# Patient Record
Sex: MALE | Race: WHITE | HISPANIC OR LATINO | Employment: STUDENT | URBAN - METROPOLITAN AREA
[De-identification: names, ages, dates, MRNs, and addresses within clinical notes are randomized per-mention and may not be internally consistent; named-entity substitution may affect disease eponyms.]

---
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
## 2025-06-09 ENCOUNTER — OFFICE VISIT (OUTPATIENT)
Dept: FAMILY MEDICINE CLINIC | Facility: CLINIC | Age: 20
End: 2025-06-09
Payer: COMMERCIAL

## 2025-06-09 VITALS
OXYGEN SATURATION: 98 % | TEMPERATURE: 97.3 F | HEART RATE: 110 BPM | HEIGHT: 72 IN | BODY MASS INDEX: 28.93 KG/M2 | WEIGHT: 213.6 LBS | DIASTOLIC BLOOD PRESSURE: 82 MMHG | SYSTOLIC BLOOD PRESSURE: 126 MMHG

## 2025-06-09 DIAGNOSIS — Z00.00 WELL ADULT EXAM: Primary | ICD-10-CM

## 2025-06-09 DIAGNOSIS — Z01.89 PATIENT REQUESTED DIAGNOSTIC TESTING: ICD-10-CM

## 2025-06-09 DIAGNOSIS — R06.83 SNORING: ICD-10-CM

## 2025-06-09 PROCEDURE — 99395 PREV VISIT EST AGE 18-39: CPT | Performed by: NURSE PRACTITIONER

## 2025-06-09 NOTE — PROGRESS NOTES
"Chief Complaint  Annual Exam    Subjective        Juan J Messina presents to [facility]  History of Present Illness  Patient presents to the office today for annual wellness exam. Denies any issues or concerns. Discussed getting lab work to obtain baseline. He verbalized understanding.   He is requesting a sleep study due to both of his parents having sleep apnea. He states he has heard there can be a genetic component so he is worried he could have this and not know it. He reports sleeping 6-8 hours a night and feels rested upon waking. He denies daytime sleepiness. He has been told that he snores.       Objective   Vital Signs:  /82 (BP Location: Right arm, Patient Position: Sitting, Cuff Size: Adult)   Pulse 110   Temp 97.3 °F (36.3 °C) (Temporal)   Ht 182.9 cm (72\")   Wt 96.9 kg (213 lb 9.6 oz)   SpO2 98%   BMI 28.97 kg/m²   Estimated body mass index is 28.97 kg/m² as calculated from the following:    Height as of this encounter: 182.9 cm (72\").    Weight as of this encounter: 96.9 kg (213 lb 9.6 oz).    BMI is >= 25 and <30. (Overweight) The following options were offered after discussion;: exercise counseling/recommendations and nutrition counseling/recommendations      Physical Exam  Vitals reviewed.   Constitutional:       Appearance: Normal appearance.   Cardiovascular:      Rate and Rhythm: Normal rate and regular rhythm.      Pulses: Normal pulses.      Heart sounds: Normal heart sounds.   Pulmonary:      Effort: Pulmonary effort is normal.      Breath sounds: Normal breath sounds.   Abdominal:      General: Bowel sounds are normal.      Palpations: Abdomen is soft.   Musculoskeletal:         General: Normal range of motion.   Skin:     General: Skin is warm and dry.   Neurological:      Mental Status: He is alert.   Psychiatric:         Mood and Affect: Mood normal.         Behavior: Behavior normal.        Result Review :               Assessment and Plan "   Diagnoses and all orders for this visit:    1. Well adult exam (Primary)  -     CBC (No Diff); Future  -     Comprehensive Metabolic Panel; Future  -     Lipid Panel; Future  -     TSH; Future    2. Snoring    3. Patient requested diagnostic testing             Follow Up   Return in about 1 year (around 6/9/2026) for Annual physical.  Patient was given instructions and counseling regarding his condition or for health maintenance advice. Please see specific information pulled into the AVS if appropriate.

## 2025-06-09 NOTE — PROGRESS NOTES
"Chief Complaint  Annual Exam    Subjective         Juan J Messina presents to [facility]  Presents to the office for a wellness physical.  Patient will have labs completed to further evaluate.  He does request a sleep study.  He states that both of his parents have sleep apnea and he has concerns about him developing this.  He does state that he snores through the night.  Patient denies being fatigued the next day.  He does state that he sleeps 6 to 8 hours nightly.         Objective     Vitals:    06/09/25 1432   BP: 126/82   BP Location: Right arm   Patient Position: Sitting   Cuff Size: Adult   Pulse: 110   Temp: 97.3 °F (36.3 °C)   TempSrc: Temporal   SpO2: 98%   Weight: 96.9 kg (213 lb 9.6 oz)   Height: 182.9 cm (72\")      Body mass index is 28.97 kg/m².    BMI is >= 25 and <30. (Overweight) The following options were offered after discussion;: nutrition counseling/recommendations        Physical Exam  Vitals reviewed.   Constitutional:       Appearance: Normal appearance.   HENT:      Head: Normocephalic and atraumatic.      Right Ear: Tympanic membrane, ear canal and external ear normal.      Left Ear: Tympanic membrane, ear canal and external ear normal.      Nose: Nose normal.      Mouth/Throat:      Mouth: Mucous membranes are moist.      Pharynx: Oropharynx is clear.   Eyes:      Extraocular Movements: Extraocular movements intact.      Conjunctiva/sclera: Conjunctivae normal.      Pupils: Pupils are equal, round, and reactive to light.   Cardiovascular:      Rate and Rhythm: Normal rate and regular rhythm.      Pulses: Normal pulses.      Heart sounds: Normal heart sounds.   Pulmonary:      Effort: Pulmonary effort is normal.      Breath sounds: Normal breath sounds.   Abdominal:      General: Abdomen is flat. Bowel sounds are normal.      Palpations: Abdomen is soft.   Musculoskeletal:         General: Normal range of motion.      Cervical back: Normal range of motion " and neck supple.   Skin:     General: Skin is warm and dry.   Neurological:      General: No focal deficit present.      Mental Status: He is alert and oriented to person, place, and time.   Psychiatric:         Mood and Affect: Mood normal.         Behavior: Behavior normal.          Result Review :   The following data was reviewed by: BOB Horton on 06/09/2025:      Procedures    Assessment and Plan   Diagnoses and all orders for this visit:    1. Well adult exam (Primary)  -     CBC (No Diff); Future  -     Comprehensive Metabolic Panel; Future  -     Lipid Panel; Future  -     TSH; Future    2. Snoring  -     Ambulatory Referral to Sleep Medicine    3. Patient requested diagnostic testing  -     Ambulatory Referral to Sleep Medicine      Preventative counseling includes healthy diet and exercise.    Follow Up   Return in about 1 year (around 6/9/2026) for Annual physical.  Patient was given instructions and counseling regarding his condition or for health maintenance advice. Please see specific information pulled into the AVS if appropriate.

## 2025-06-24 ENCOUNTER — OFFICE VISIT (OUTPATIENT)
Dept: SLEEP MEDICINE | Facility: HOSPITAL | Age: 20
End: 2025-06-24
Payer: COMMERCIAL

## 2025-06-24 VITALS — OXYGEN SATURATION: 96 % | WEIGHT: 222.6 LBS | HEART RATE: 89 BPM | HEIGHT: 72 IN | BODY MASS INDEX: 30.15 KG/M2

## 2025-06-24 DIAGNOSIS — R29.818 SUSPECTED SLEEP APNEA: Primary | ICD-10-CM

## 2025-06-24 DIAGNOSIS — E66.811 CLASS 1 OBESITY WITH BODY MASS INDEX (BMI) OF 30.0 TO 30.9 IN ADULT, UNSPECIFIED OBESITY TYPE, UNSPECIFIED WHETHER SERIOUS COMORBIDITY PRESENT: ICD-10-CM

## 2025-06-24 DIAGNOSIS — R06.83 SNORING: ICD-10-CM

## 2025-06-24 DIAGNOSIS — Z82.0 FAMILY HISTORY OF SLEEP APNEA: ICD-10-CM

## 2025-06-24 PROCEDURE — 99214 OFFICE O/P EST MOD 30 MIN: CPT | Performed by: NURSE PRACTITIONER

## 2025-06-24 PROCEDURE — G0463 HOSPITAL OUTPT CLINIC VISIT: HCPCS

## 2025-06-24 NOTE — PROGRESS NOTES
[facility]  [address] [place] [id number]  Phone: [phone]  Fax: [phone]      Juan J Messina  [id number]   2005  20 y.o.  male      Referring Provider and PCP: Nestor Dempsey APRN    Type of service: Initial Sleep Medicine Consult  Date of service: 6/24/2025          CHIEF COMPLAINT: Snoring, suspected sleep apnea      HISTORY OF PRESENT ILLNESS:  Juan J Messina 20 y.o. was seen today on 6/24/2025 at [facility].    Patient has a history of seasonal/environmental allergies, hyperlipidemia, asthma, for which the patient follows with outside providers. Patient has no history of tonsillectomy, adenoidectomy, nasal surgery, UPPP.  Patient presents today with symptoms of loud snoring and suspected sleep apnea.  Patient reports he has been having really loud snoring for the last 2 to 3 years or so.  Concerned about sleep apnea as both parents have sleep apnea.  Patient's father is with him in the office today, per patient preference.  Patient is currently going to [employer] and is living at home.  Studying cyber security.        SLEEP HISTORY:  Sleep schedule:  Bedtime: 11:30 PM weeknights, midnight weekends  Wake time: 8:40 AM weekdays, 11 AM weekends  Time it takes to fall asleep: 5 to 10 minutes  Average hours of sleep: 9-week nights, 10 weekends  Number of naps per day: 0    Symptoms:   In addition to the above, patient reports the following associated symptoms:  Have you ever awakened gasping for breath, coughing, choking: No   Change in weight: Yes, gained 10 pounds  Morning headaches:  No   Awaken with a sore throat or dry mouth:  No   Leg jerking at night:  No   Creepy crawly feeling in legs/urge to move legs: No   Teeth grinding: No   Have you ever awakened at night with a sour taste or burning sensation in your chest:  No   Do you have muscle weakness with laughing or anger:  No   Have you ever felt paralyzed while going to sleep or waking up:  No  "  Sleepwalking: No   Nightmares: No   Nocturia (urination at night): 0 times per night  Memory Problem: No     Medical Conditions (PMH):   Asthma  Seasonal/environmental allergies    Social history:  Do you drive a commercial vehicle:  No   Shift work:  No   Tobacco use:  No   Alcohol use:   Caffeinated drinks: 1 per day  Marijuana or other drug use: No  Occupation: Student    Family History (parents and siblings) (pertaining to sleep medicine):  Sleep apnea  Obesity    Medications: reviewed    Allergies:  Amoxicillin      REVIEW OF SYSTEMS:  Pertinent positive symptoms are:  Snoring  [place] Sleepiness Scale of Total score: 7   Fatigue   Negative:  Denies loose teeth or dental issues  Denies TMJ issues      PHYSICAL EXAM:  CONSTITUTINONAL:   Vitals:    06/24/25 1300   Pulse: 89   SpO2: 96%   Weight: 101 kg (222 lb 9.6 oz)   Height: 182.9 cm (72\")    Body mass index is 30.19 kg/m².   HEAD: atraumatic, normocephalic   THROAT: tonsils are not significant, Mallampati class III  NECK: Neck Circumference: 15 inches, trachea is midline  RESPIRATORY SYSTEM: Respirations even, unlabored, normal rate  CARDIOVASULAR SYSTEM: Normal rate, no edema   NEUROLOGICAL SYSTEM: Alert and oriented x 3  PSYCHIATRIC SYSTEM: Mood is normal/ appropriate     Office note(s) from care team reviewed. Office note(s) reviewed: 6/2025 primary care note reviewed    Labs/ Test Results Reviewed:      6/2024 CBC and CMP reviewed--- CO2 level within normal limits, no anemia          ASSESSMENT AND PLAN:   Suspected sleep apnea: patient's symptoms and physical examination are concerning for possible sleep apnea.   I discussed the signs, symptoms, and pathophysiology of sleep apnea with this patient.  I also discussed the possible complications of untreated sleep apnea including but not limited to potential risk of resistant hypertension, insulin resistance, pulmonary hypertension, atrial fibrillation or other arrhythmias, heart attack, stroke, " nonrestorative sleep with hypersomnia which can increase risk for motor vehicle accidents, etc.   Different testing methods including home-based and lab based sleep studies were discussed with this patient.   Based on patient history and physical examination, will proceed with home sleep study.  The order for the sleep study is placed in [facility].  The test will be scheduled after prior authorization has been obtained through patient's insurance.  Discussed overview of treatment options for sleep apnea in the office today including PAP therapy and oral mandibular advancement device, and treatment/ management will be discussed in more detail with this patient after the test is completed.  All questions were answered to patient's satisfaction.   Snoring: snoring is the sound created by turbulent airflow vibrating upper airway soft tissue.  I have also discussed factors affecting snoring including sleep deprivation, sleeping on the back and alcohol ingestion. To minimize snoring, patient is advised to have adequate sleep, sleep on their side, and avoid alcohol and sedative medications around bedtime.   Fatigue [place] Sleepiness Scale of Total score: 7.  There are many causes of daytime sleepiness and/or fatigue.  Evaluate for sleep apnea as a possible contributing factor, as above.  Do not drive, operate heavy machinery, or do activities that require high concentration if feeling tired/drowsy.  Obesity: Body mass index is 30.19 kg/m².. Patients who are overweight or obese are at increased risk of sleep apnea/ sleep disordered breathing. Weight reduction and healthy lifestyle are encouraged in overweight/ obese patients as part of a comprehensive approach to sleep apnea treatment.       Patient will follow-up after study, 31 to 90 days after PAP therapy initiated if applicable, or contact the office sooner for questions or concerns. Patient's questions were answered.            Thank you again for asking me to consult on  this patient.  Please do not hesitate to call me if you have additional questions or concerns.       Emili Griffin DNP, APRN  [facility]

## 2025-07-08 ENCOUNTER — HOSPITAL ENCOUNTER (OUTPATIENT)
Dept: SLEEP MEDICINE | Facility: HOSPITAL | Age: 20
Discharge: HOME | End: 2025-07-08
Admitting: NURSE PRACTITIONER
Payer: COMMERCIAL

## 2025-07-08 DIAGNOSIS — E66.811 CLASS 1 OBESITY WITH BODY MASS INDEX (BMI) OF 30.0 TO 30.9 IN ADULT, UNSPECIFIED OBESITY TYPE, UNSPECIFIED WHETHER SERIOUS COMORBIDITY PRESENT: ICD-10-CM

## 2025-07-08 DIAGNOSIS — Z82.0 FAMILY HISTORY OF SLEEP APNEA: ICD-10-CM

## 2025-07-08 DIAGNOSIS — R29.818 SUSPECTED SLEEP APNEA: ICD-10-CM

## 2025-07-08 DIAGNOSIS — R06.83 SNORING: ICD-10-CM

## 2025-07-08 PROCEDURE — G0399 HOME SLEEP TEST/TYPE 3 PORTA: HCPCS

## 2025-07-16 DIAGNOSIS — R06.83 SNORING: ICD-10-CM

## 2025-07-16 DIAGNOSIS — G47.33 OSA (OBSTRUCTIVE SLEEP APNEA): Primary | ICD-10-CM

## 2025-07-24 ENCOUNTER — TELEPHONE (OUTPATIENT)
Dept: SLEEP MEDICINE | Facility: HOSPITAL | Age: 20
End: 2025-07-24
Payer: COMMERCIAL